# Patient Record
Sex: FEMALE | Race: WHITE | Employment: FULL TIME | URBAN - METROPOLITAN AREA
[De-identification: names, ages, dates, MRNs, and addresses within clinical notes are randomized per-mention and may not be internally consistent; named-entity substitution may affect disease eponyms.]

---

## 2022-10-31 ENCOUNTER — TELEPHONE (OUTPATIENT)
Dept: BREAST CENTER | Age: 27
End: 2022-10-31

## 2022-10-31 NOTE — TELEPHONE ENCOUNTER
RN made first attempt to schedule patient for consult with [facility] breast clinic. Pt was referred to office for high risk evaluation by [facility]. RN left voicemail with office contact information for patient to call back and schedule referral appt. Patient needs scheduled for NEW PATIENT-High risk evaluation-family hx breast cancer and endometrial-ref by [facility]      Patient needs no imaging unless having breast issues. Confirm no breast issues and that has never had any breast imaging done before.            Electronically signed by Afsaneh Lloyd RN on 10/31/22 at 2:15 PM EDT

## 2022-11-14 NOTE — PROGRESS NOTES
Subjective:      Patient ID: Conor Clements is a 28 y.o. female. HPI  Conor Clements presents for breast cancer risk assessment. PCP: ,  Gynecologist:  Ed Lang. @ [facility] in [place]. Fioan Fernandez is a 28 y.o. extremely pleasant young lady who has a past medical history of PCOS, tobacco abuse, anxiety. She presents today for further discussion regarding her predisposition to cancer. Patient does routinely do self breast exams. Patient denies nipple discharge. Patient denies previous breast biopsy. Patient denies a personal history of breast cancer. Breast cancer risk factors include gender, elevated BMI, and family history that includes: Maternal grandmother with endometrial cancer age 48; breast cancer age 72  Mother with endometrial cancer age 62 (completed undergoing treatment). Paternal grandmother with breast cancer in her late 52's. Menarche age 5. Regular menses. . Current bra size is 38 DDD  Height 5'3\". Weight 248. BMI 43.93    Patient drinks moderate caffeinated beverages. She does smoke occasional cigarettes. Smokes Marijuana, mostly on the weekends. Because violence is so common, we ask all our patients: are you in a relationship or do you live with a person who threatens, hurts, or controls you:  Denies. Reports she just came out of a bad breakup, but is doing better. She lives with her dad, but spends times with friends in [facility]. Past Medical History:   Diagnosis Date    Anxiety     Depression     PCOS (polycystic ovarian syndrome)      No past surgical history on file.     Social History     Socioeconomic History    Marital status: Single     Spouse name: None    Number of children: None    Years of education: None    Highest education level: None   Tobacco Use    Smoking status: Every Day     Types: Cigarettes    Smokeless tobacco: Never   Vaping Use    Vaping Use: Every day    Substances: Nicotine, Flavoring   Substance and Sexual Activity    Alcohol use: Yes     Comment: rare    Drug use: Yes     Types: Marijuana (Weed)     No Known Allergies    Current Outpatient Medications on File Prior to Visit   Medication Sig Dispense Refill    drospirenone-ethinyl estradiol (ROSAS) 3-0.02 MG per tablet take 1 tablet by mouth daily       No current facility-administered medications on file prior to visit. Family History   Problem Relation Age of Onset    Cancer Mother 62        endometrial    Cancer Maternal Grandmother 61        endometrial       Social History     Socioeconomic History    Marital status: Not on file     Spouse name: Not on file    Number of children: Not on file    Years of education: Not on file    Highest education level: Not on file   Occupational History    Not on file   Tobacco Use    Smoking status: Not on file    Smokeless tobacco: Not on file   Substance and Sexual Activity    Alcohol use: Not on file    Drug use: Not on file    Sexual activity: Not on file   Other Topics Concern    Not on file   Social History Narrative    Not on file     Social Determinants of Health     Financial Resource Strain: Not on file   Food Insecurity: Not on file   Transportation Needs: Not on file   Physical Activity: Not on file   Stress: Not on file   Social Connections: Not on file   Intimate Partner Violence: Not on file   Housing Stability: Not on file     OCCUPATION: works as an  in a retail store. She is a      Review of Systems   Constitutional:  Negative for activity change, appetite change and fever. Reports that she generally feels well. She does have intermittent discomfort of the bilateral outer breast quadrants, which appears to be related to her bra. HENT: Negative. Respiratory: Negative. Negative for cough, choking and shortness of breath. Cardiovascular:  Negative for chest pain and palpitations. Genitourinary:  Positive for menstrual problem.         History of PCOS; menstrual cycles are irregular but she was recently started on oral birth control to see if that will promote regularity. Musculoskeletal:  Positive for neck pain. Negative for arthralgias, back pain and myalgias. She has chronic neck pain related to macromastia which has not improved despite changing her bra, over-the-counter oral and topical analgesics. Neurological: Negative. Patient denies previous history of DVT/PE. Objective:   Physical Exam  Vitals and nursing note reviewed. Constitutional:       Appearance: Normal appearance. She is obese. Comments: Pleasant and conversant and in no apparent distress. Good performance status   HENT:      Head: Normocephalic and atraumatic. Eyes:      Extraocular Movements: Extraocular movements intact. Conjunctiva/sclera: Conjunctivae normal.   Cardiovascular:      Rate and Rhythm: Normal rate and regular rhythm. Heart sounds: Normal heart sounds. Pulmonary:      Effort: Pulmonary effort is normal.      Breath sounds: Normal breath sounds. Chest:      Chest wall: No mass. Comments: Breast tissue with bilateral, age appropriate density. No skin changes and no evidence of malignancy appreciated. Abdominal:      Palpations: Abdomen is soft. Musculoskeletal:         General: Normal range of motion. Cervical back: Normal range of motion and neck supple. Skin:     General: Skin is warm and dry. Neurological:      General: No focal deficit present. Mental Status: She is alert and oriented to person, place, and time. Psychiatric:         Mood and Affect: Mood normal.         Thought Content: Thought content normal.         Judgment: Judgment normal.       Assessment:    Taylor is a 28 y.o. gayatri young woman who presents to discuss her predisposition to cancer. Risks for breast cancer include gender, elevated BMI, and family history of breast, uterine, and pancreatic cancer. Clinically, her breast exam is unremarkable.   We discussed that I cannot guarantee that she does not have breast cancer now; nor can I guarantee that she won't develop breast cancer in the future. However, there were no concerning findings identified on this visit. We reviewed healthy lifestyle, avoiding alcohol, and BSE in detail. We discussed her family history in detail on this visit and would recommend genetic testing. We reviewed that Seo syndrome and Cowden syndrome are two inherited syndromes known to increase a woman's lifetime risk of endometrial cancer and can also play a role in breast cancer risk. While I did offer a referral to genetic counseling, we discussed that her mother or her grandmother would be the best ones to test for any genetic mutations due to their current diagnosis of cancer. She believes her grandmother had genetic testing within the past 5 years and will try to obtain a copy of that report. If her or her family's genetic testing is negative, would recommend routine breast imaging starting at age 36, sooner for any new or worsening symptoms. Her current complaints are most notable for neck pain related to macromastia. She has tried over-the-counter analgesics without relief. While I believe she may benefit from reduction mammoplasty, we will refer her to physical therapy to evaluate and treat. We discussed the importance of smoking cessation and weight reduction to not only reduce her risk of cancer, but also to improve her cervical discomfort and overall health. She is requesting referral to a female primary care physician. Options given to patient re: providers of service. Pt chose [facility], referral made. Plan:      Continue monthly breast self examination; detailed instructions reviewed today. Bring any changes to your physician's attention. Education/Lifestyle recommendations: A regular exercise program is encouraged. Avoid excessive caffeine intake.  Maintain a diet rich in vegetables and fruits avoiding processed and fast food. Avoid alcohol. Limit caffeine intake. Refer to [facility] for primary health and wellness. Refer to physical therapy for neck pain not responding to over-the-counter analgesics. Await copy of her grandmothers genetic testing results, patient will obtain. If no genetic testing for her grandmother, refer to genetic counselor. Office follow-up visit should be scheduled in 6 months and PRN. I spent a total of 45 minutes on the date of the service which included preparing to see the patient, face-to-face patient care, completing clinical documentation, obtaining and/or reviewing separately obtained history, performing a medically appropriate examination, counseling and educating the patient/family/caregiver, ordering medications, tests, or procedures, communicating with other HCPs (not separately reported), independently interpreting results (not separately reported), communicating results to the patient/family/caregiver and care coordination (not separately reported). This document is generated, in part, by voice recognition software and thus syntax and grammatical errors are possible. Linwood Vegas, RN, MSN, APRN-CNP, [address]  Advanced Oncology Certified Nurse Practitioner  Department of Breast Surgery  [facility]/  [facility] in collaboration with Dr. Akua Carey/Dr. Farhana Sousa/Dr. Polina Gonzalez APRN-CNP

## 2022-11-29 ENCOUNTER — OFFICE VISIT (OUTPATIENT)
Dept: BREAST CENTER | Age: 27
End: 2022-11-29
Payer: COMMERCIAL

## 2022-11-29 VITALS
RESPIRATION RATE: 12 BRPM | WEIGHT: 248 LBS | HEIGHT: 63 IN | OXYGEN SATURATION: 97 % | BODY MASS INDEX: 43.94 KG/M2 | HEART RATE: 81 BPM | SYSTOLIC BLOOD PRESSURE: 126 MMHG | TEMPERATURE: 98.1 F | DIASTOLIC BLOOD PRESSURE: 74 MMHG

## 2022-11-29 DIAGNOSIS — E66.01 CLASS 3 SEVERE OBESITY DUE TO EXCESS CALORIES WITHOUT SERIOUS COMORBIDITY IN ADULT, UNSPECIFIED BMI (HCC): ICD-10-CM

## 2022-11-29 DIAGNOSIS — Z00.00 WELLNESS EXAMINATION: ICD-10-CM

## 2022-11-29 DIAGNOSIS — Z91.89 AT HIGH RISK FOR BREAST CANCER: Primary | ICD-10-CM

## 2022-11-29 DIAGNOSIS — N62 MACROMASTIA: ICD-10-CM

## 2022-11-29 DIAGNOSIS — M54.2 NECK PAIN: ICD-10-CM

## 2022-11-29 PROCEDURE — 99204 OFFICE O/P NEW MOD 45 MIN: CPT | Performed by: NURSE PRACTITIONER

## 2022-11-29 PROCEDURE — 99203 OFFICE O/P NEW LOW 30 MIN: CPT | Performed by: NURSE PRACTITIONER

## 2022-11-29 RX ORDER — DROSPIRENONE AND ETHINYL ESTRADIOL 0.02-3(28)
KIT ORAL
COMMUNITY
Start: 2022-11-10

## 2022-11-29 ASSESSMENT — ENCOUNTER SYMPTOMS
RESPIRATORY NEGATIVE: 1
BACK PAIN: 0
SHORTNESS OF BREATH: 0
COUGH: 0
CHOKING: 0

## 2022-11-29 NOTE — LETTER
[facility]  [address]  [place] [address]  Phone: [phone]  Fax: [phone]           NAHUM Ochoa CNP      November 29, 2022     Patient: Rosaline Moreno   MR Number: [id number]   YOB: 1995   Date of Visit: 11/29/2022       Dear Melvin Mcgowan PA-C:    Thank you for referring Rosaline Moreno to me for evaluation/treatment. Below are the relevant portions of my assessment and plan of care. Taylor is a 28 y.o. gayatri young woman who presents to discuss her predisposition to cancer. Risks for breast cancer include gender, elevated BMI, and family history of breast, uterine, and pancreatic cancer. Clinically, her breast exam is unremarkable. We discussed that I cannot guarantee that she does not have breast cancer now; nor can I guarantee that she won't develop breast cancer in the future. However, there were no concerning findings identified on this visit. We reviewed healthy lifestyle, avoiding alcohol, and BSE in detail.      We discussed her family history in detail on this visit and would recommend genetic testing. We reviewed that Seo syndrome and Cowden syndrome are two inherited syndromes known to increase a woman's lifetime risk of endometrial cancer and can also play a role in breast cancer risk. While I did offer a referral to genetic counseling, we discussed that her mother or her grandmother would be the best ones to test for any genetic mutations due to their current diagnosis of cancer. She believes her grandmother had genetic testing within the past 5 years and will try to obtain a copy of that report.     If her or her family's genetic testing is negative, I would recommend routine breast imaging starting at age 36, sooner for any new or worsening symptoms. Her current complaints are most notable for neck pain related to macromastia. She has tried over-the-counter analgesics without relief.   While I believe she may benefit from reduction mammoplasty, we will refer her to physical therapy to evaluate and treat. We discussed the importance of smoking cessation and weight reduction to not only reduce her risk of cancer, but also to improve her cervical discomfort and overall health. We will plan to see in 6 months and PRN to review all of the above and make further recommendations.     She is requesting referral to a female primary care physician. Options given to patient re: providers of service. Pt chose [facility], referral made. If you have questions, please do not hesitate to call me. I look forward to following Taylor along with you appreciate the opportunity to participate in her care. Sincerely,    Herminia Curtis ([facility]) Emmanuel Montanez, RN, MSN, APRN-CNP, [address]  Advanced Oncology Certified Nurse Practitioner  Department of Breast Surgery  [facility] [place]/  [facility] in collaboration with Dr. Rebeca Carey/Dr. Jonny Sousa/Dr. Andre Fortune APRN-CNP

## 2022-11-29 NOTE — PATIENT INSTRUCTIONS
PCP referral will be submitted    Referral will be submitted to physical therapy - they will contact patient

## 2023-05-31 ENCOUNTER — TELEPHONE (OUTPATIENT)
Dept: BREAST CENTER | Age: 28
End: 2023-05-31

## 2023-05-31 NOTE — TELEPHONE ENCOUNTER
BRANDY Friend called patient regarding her missed appointment with PAYTON Willson on 5/31/2023. Patient did not answer and her voicemail is full so unable to leave a message.        Patient needs rescheduled for ESTABLISHED:  6 month follow up - strong family history for breast cancer with NP    Electronically signed by Huang Oglesby RN on 5/31/23 at 9:04 AM EDT

## 2023-05-31 NOTE — TELEPHONE ENCOUNTER
Patient returned call. She missed her appointment today in the breast clinic. She states she moved to [place] and will not be coming back for any office visits. I encouraged her to let us know if she needs any information sent to a new facility for her care.